# Patient Record
(demographics unavailable — no encounter records)

---

## 2025-06-30 NOTE — PHYSICAL EXAM
[General Appearance - Alert] : alert [General Appearance - In No Acute Distress] : in no acute distress [Delayed in the Right Toes] : capillary refills normal in right toes [2+] : right foot dorsalis pedis 2+ [de-identified] : Pain on palpation at medial calcaneal tuberosity of Right and diffuse tenderness along the Achilles tendon Increased pain with the windless mechanism.  Flexible mid/hindfoot deformity noted foot passively corrected to plantigrade foot position Right Left.  Decreased range of motion in dorsiflexion Right Left. [] : no rash [Skin Lesions] : no skin lesions [Sensation] : the sensory exam was normal to light touch and pinprick [No Focal Deficits] : no focal deficits [Deep Tendon Reflexes (DTR)] : deep tendon reflexes were 2+ and symmetric [Oriented To Time, Place, And Person] : oriented to person, place, and time [Motor Exam] : the motor exam was normal [Impaired Insight] : insight and judgment were intact [Affect] : the affect was normal

## 2025-06-30 NOTE — HISTORY OF PRESENT ILLNESS
[FreeTextEntry1] : Presents in the [place] office for right ankle and heel pain. States pain began 3-4 months ago. Denies pain. 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  Has noticed worsening with pain.  drives 7 days weeks, for about an hour  she states she unable to tolerate heels anymore.

## 2025-06-30 NOTE — ASSESSMENT
[FreeTextEntry1] : Discussed diagnosis and treatment with patient Discussed etiology of symptoms patient is experiencing Obtained/reviewed right foot xrays 3 views WB 6/30/25: focal enthesopathy noted to posterior and plantar heel Demonstrated proper stretching techniques with patient showing understanding Discussed proper RICE techniques to reduce inflammation and for pain control Discussed specific examples of over-the-counter inserts to control heel eversion and support the medial arch Discussed proper shoes (stiff heel counter and midsole with flexion at the 1st MTPJ) Patient kindly declines oral NSAIDs Discussed all risks, complications, and benefits of corticosteroid injection therapy.  Will have further discussion at next visit   Patient to return to the office in 10 weeks